# Patient Record
Sex: MALE | Race: ASIAN | NOT HISPANIC OR LATINO | Employment: FULL TIME | URBAN - METROPOLITAN AREA
[De-identification: names, ages, dates, MRNs, and addresses within clinical notes are randomized per-mention and may not be internally consistent; named-entity substitution may affect disease eponyms.]

---

## 2021-12-28 PROBLEM — B01.9 CHICKEN POX: Status: ACTIVE | Noted: 2021-12-28

## 2021-12-30 ENCOUNTER — OFFICE VISIT (OUTPATIENT)
Dept: FAMILY MEDICINE CLINIC | Facility: CLINIC | Age: 36
End: 2021-12-30

## 2021-12-30 ENCOUNTER — PATIENT ROUNDING (BHMG ONLY) (OUTPATIENT)
Dept: FAMILY MEDICINE CLINIC | Facility: CLINIC | Age: 36
End: 2021-12-30

## 2021-12-30 VITALS
TEMPERATURE: 97.6 F | WEIGHT: 238 LBS | OXYGEN SATURATION: 95 % | HEART RATE: 74 BPM | DIASTOLIC BLOOD PRESSURE: 84 MMHG | SYSTOLIC BLOOD PRESSURE: 128 MMHG | HEIGHT: 69 IN | BODY MASS INDEX: 35.25 KG/M2

## 2021-12-30 DIAGNOSIS — H90.42 SENSORINEURAL HEARING LOSS (SNHL) OF LEFT EAR WITH UNRESTRICTED HEARING OF RIGHT EAR: ICD-10-CM

## 2021-12-30 DIAGNOSIS — Z00.00 ENCOUNTER FOR MEDICAL EXAMINATION TO ESTABLISH CARE: Primary | ICD-10-CM

## 2021-12-30 PROCEDURE — 99385 PREV VISIT NEW AGE 18-39: CPT | Performed by: FAMILY MEDICINE

## 2021-12-30 NOTE — PROGRESS NOTES
Chief Complaint   Patient presents with   • Establish Care        Subjective     Isaias Mitchell  has no past medical history on file.    Establish care-he states up until now he has never really had a primary care physician.  He has no specific concerns today.  He has no chronic illnesses or medications.      PHQ-2 Depression Screening  Little interest or pleasure in doing things? 0   Feeling down, depressed, or hopeless? 0   PHQ-2 Total Score 0   PHQ-9 Depression Screening  Little interest or pleasure in doing things? 0   Feeling down, depressed, or hopeless? 0   Trouble falling or staying asleep, or sleeping too much?     Feeling tired or having little energy?     Poor appetite or overeating?     Feeling bad about yourself - or that you are a failure or have let yourself or your family down?     Trouble concentrating on things, such as reading the newspaper or watching television?     Moving or speaking so slowly that other people could have noticed? Or the opposite - being so fidgety or restless that you have been moving around a lot more than usual?     Thoughts that you would be better off dead, or of hurting yourself in some way?     PHQ-9 Total Score 0   If you checked off any problems, how difficult have these problems made it for you to do your work, take care of things at home, or get along with other people?       No Known Allergies    Prior to Admission medications    Not on File        Patient Active Problem List   Diagnosis   • Chicken pox   • Encounter for medical examination to [facility] care   • Sensorineural hearing loss (SNHL) of left ear with unrestricted hearing of right ear        History reviewed. No pertinent surgical history.    Social History     Socioeconomic History   • Marital status:    Tobacco Use   • Smoking status: Never Smoker   • Smokeless tobacco: Never Used   Vaping Use   • Vaping Use: Never used       History reviewed. No pertinent family history.    Family history,  "surgical history, past medical history, Allergies and med's reviewed with patient today and updated in [facility] EMR.     ROS:  Review of Systems   Constitutional: Negative for fatigue.   HENT: Positive for hearing loss (Left ear). Negative for congestion, postnasal drip and rhinorrhea.    Eyes: Negative for blurred vision and visual disturbance.   Respiratory: Negative for cough, chest tightness, shortness of breath and wheezing.    Cardiovascular: Negative for chest pain and palpitations.   Gastrointestinal: Negative for abdominal pain, constipation and diarrhea.   Neurological: Negative for headache.   Psychiatric/Behavioral: Negative for depressed mood. The patient is not nervous/anxious.        OBJECTIVE:  Vitals:    12/30/21 1010   BP: 128/84   BP Location: Right arm   Patient Position: Sitting   Pulse: 74   Temp: 97.6 °F (36.4 °C)   SpO2: 95%   Weight: 108 kg (238 lb)   Height: 175.3 cm (69\")     No exam data present   Body mass index is 35.15 kg/m².  No LMP for male patient.    Physical Exam  Vitals and nursing note reviewed.   Constitutional:       General: He is not in acute distress.     Appearance: Normal appearance. He is obese.   HENT:      Head: Normocephalic.      Right Ear: Tympanic membrane, ear canal and external ear normal.      Left Ear: Tympanic membrane, ear canal and external ear normal.      Nose: Nose normal.      Mouth/Throat:      Mouth: Mucous membranes are moist.      Pharynx: Oropharynx is clear.   Eyes:      General: No scleral icterus.     Conjunctiva/sclera: Conjunctivae normal.      Pupils: Pupils are equal, round, and reactive to light.   Cardiovascular:      Rate and Rhythm: Normal rate and regular rhythm.      Pulses: Normal pulses.      Heart sounds: Normal heart sounds. No murmur heard.      Pulmonary:      Effort: Pulmonary effort is normal.      Breath sounds: Normal breath sounds. No wheezing, rhonchi or rales.   Abdominal:      General: Abdomen is flat. Bowel sounds are normal. "      Palpations: Abdomen is soft. There is no mass.      Tenderness: There is no abdominal tenderness. There is no right CVA tenderness.   Musculoskeletal:      Cervical back: No rigidity or tenderness.   Lymphadenopathy:      Cervical: No cervical adenopathy.   Skin:     General: Skin is warm and dry.      Coloration: Skin is not jaundiced.      Findings: No rash.   Neurological:      General: No focal deficit present.      Mental Status: He is alert and oriented to person, place, and time.   Psychiatric:         Mood and Affect: Mood normal.         Thought Content: Thought content normal.         Judgment: Judgment normal.         Procedures    No visits with results within 30 Day(s) from this visit.   Latest known visit with results is:   No results found for any previous visit.       ASSESSMENT/ PLAN:    Diagnoses and all orders for this visit:    1. Encounter for medical examination to establish care (Primary)  Assessment & Plan:  He is an otherwise healthy young man.  He just needs continue to work on lifestyle changes with diet exercise and weight loss.    Orders:  -     Comprehensive Metabolic Panel  -     CBC & Differential  -     TSH  -     Lipid Panel    2. Sensorineural hearing loss (SNHL) of left ear with unrestricted hearing of right ear  Assessment & Plan:  He has had a chronic hearing loss in his left ear since the age of about 15.  The cause of this is unknown.  He states since that period of time he has not gotten any worse.        Orders Placed Today:     No orders of the defined types were placed in this encounter.       Management Plan:     An After Visit Summary was printed and given to the patient at discharge.    Follow-up: Return in about 1 year (around 12/30/2022).    Andreas Seymour,  12/30/2021 11:03 EST  This note was electronically signed.

## 2021-12-30 NOTE — PROGRESS NOTES
"December 30, 2021    Hello, may I speak with Isaias Mitchell?    My name is KATALINA     I am  with [facility]  [address] [place] [address] [address] [place] [id number] [phone].    Before we get started may I verify your date of birth? 1985 \"Yes\"    I am calling to officially welcome you to our practice and ask about your recent visit. Is this a good time to talk? \"Yes\"     Tell me about your visit with us. What things went well?  \"It was professional, I felt like good about the visit.\"       We're always looking for ways to make our patients' experiences even better. Do you have recommendations on ways we may improve?    \" I think the only thing I had a issue with was I had to wait awhile.   I understand everyone is stretched.  The start time was 15min before start time but I came 30 minutes early.\"      Overall were you satisfied with your first visit to our practice? \"Yes\"       I appreciate you taking the time to speak with me today. Is there anything else I can do for you? \"No\"    Thank you, and have a great day.      "

## 2022-01-06 ENCOUNTER — TELEPHONE (OUTPATIENT)
Dept: FAMILY MEDICINE CLINIC | Facility: CLINIC | Age: 37
End: 2022-01-06

## 2023-01-03 ENCOUNTER — OFFICE VISIT (OUTPATIENT)
Dept: FAMILY MEDICINE CLINIC | Facility: CLINIC | Age: 38
End: 2023-01-03
Payer: COMMERCIAL

## 2023-01-03 VITALS
WEIGHT: 245 LBS | BODY MASS INDEX: 36.29 KG/M2 | SYSTOLIC BLOOD PRESSURE: 122 MMHG | TEMPERATURE: 97.8 F | DIASTOLIC BLOOD PRESSURE: 73 MMHG | OXYGEN SATURATION: 96 % | HEIGHT: 69 IN | HEART RATE: 98 BPM

## 2023-01-03 DIAGNOSIS — Z00.00 ENCOUNTER FOR ANNUAL PHYSICAL EXAM: ICD-10-CM

## 2023-01-03 DIAGNOSIS — R06.83 LOUD SNORING: Primary | ICD-10-CM

## 2023-01-03 LAB
ALBUMIN SERPL-MCNC: 4.7 G/DL (ref 3.5–5.2)
ALBUMIN/GLOB SERPL: 2 G/DL
ALP SERPL-CCNC: 78 U/L (ref 39–117)
ALT SERPL W P-5'-P-CCNC: 82 U/L (ref 1–41)
ANION GAP SERPL CALCULATED.3IONS-SCNC: 9.6 MMOL/L (ref 5–15)
AST SERPL-CCNC: 28 U/L (ref 1–40)
BASOPHILS # BLD AUTO: 0.05 10*3/MM3 (ref 0–0.2)
BASOPHILS NFR BLD AUTO: 0.8 % (ref 0–1.5)
BILIRUB SERPL-MCNC: 0.4 MG/DL (ref 0–1.2)
BUN SERPL-MCNC: 13 MG/DL (ref 6–20)
BUN/CREAT SERPL: 14.1 (ref 7–25)
CALCIUM SPEC-SCNC: 9.4 MG/DL (ref 8.6–10.5)
CHLORIDE SERPL-SCNC: 101 MMOL/L (ref 98–107)
CHOLEST SERPL-MCNC: 211 MG/DL (ref 0–200)
CO2 SERPL-SCNC: 27.4 MMOL/L (ref 22–29)
CREAT SERPL-MCNC: 0.92 MG/DL (ref 0.76–1.27)
DEPRECATED RDW RBC AUTO: 44.5 FL (ref 37–54)
EGFRCR SERPLBLD CKD-EPI 2021: 111.2 ML/MIN/1.73
EOSINOPHIL # BLD AUTO: 0.14 10*3/MM3 (ref 0–0.4)
EOSINOPHIL NFR BLD AUTO: 2.1 % (ref 0.3–6.2)
ERYTHROCYTE [DISTWIDTH] IN BLOOD BY AUTOMATED COUNT: 13.1 % (ref 12.3–15.4)
GLOBULIN UR ELPH-MCNC: 2.4 GM/DL
GLUCOSE SERPL-MCNC: 118 MG/DL (ref 65–99)
HCT VFR BLD AUTO: 43.7 % (ref 37.5–51)
HDLC SERPL-MCNC: 30 MG/DL (ref 40–60)
HGB BLD-MCNC: 14.5 G/DL (ref 13–17.7)
IMM GRANULOCYTES # BLD AUTO: 0.02 10*3/MM3 (ref 0–0.05)
IMM GRANULOCYTES NFR BLD AUTO: 0.3 % (ref 0–0.5)
LDLC SERPL CALC-MCNC: 123 MG/DL (ref 0–100)
LDLC/HDLC SERPL: 3.83 {RATIO}
LYMPHOCYTES # BLD AUTO: 2.15 10*3/MM3 (ref 0.7–3.1)
LYMPHOCYTES NFR BLD AUTO: 32.9 % (ref 19.6–45.3)
MCH RBC QN AUTO: 30.5 PG (ref 26.6–33)
MCHC RBC AUTO-ENTMCNC: 33.2 G/DL (ref 31.5–35.7)
MCV RBC AUTO: 91.8 FL (ref 79–97)
MONOCYTES # BLD AUTO: 0.57 10*3/MM3 (ref 0.1–0.9)
MONOCYTES NFR BLD AUTO: 8.7 % (ref 5–12)
NEUTROPHILS NFR BLD AUTO: 3.6 10*3/MM3 (ref 1.7–7)
NEUTROPHILS NFR BLD AUTO: 55.2 % (ref 42.7–76)
NRBC BLD AUTO-RTO: 0 /100 WBC (ref 0–0.2)
PLATELET # BLD AUTO: 302 10*3/MM3 (ref 140–450)
PMV BLD AUTO: 10.6 FL (ref 6–12)
POTASSIUM SERPL-SCNC: 3.8 MMOL/L (ref 3.5–5.2)
PROT SERPL-MCNC: 7.1 G/DL (ref 6–8.5)
RBC # BLD AUTO: 4.76 10*6/MM3 (ref 4.14–5.8)
SODIUM SERPL-SCNC: 138 MMOL/L (ref 136–145)
TRIGL SERPL-MCNC: 330 MG/DL (ref 0–150)
TSH SERPL DL<=0.05 MIU/L-ACNC: 1.4 UIU/ML (ref 0.27–4.2)
VLDLC SERPL-MCNC: 58 MG/DL (ref 5–40)
WBC NRBC COR # BLD: 6.53 10*3/MM3 (ref 3.4–10.8)

## 2023-01-03 PROCEDURE — 80050 GENERAL HEALTH PANEL: CPT | Performed by: FAMILY MEDICINE

## 2023-01-03 PROCEDURE — 99395 PREV VISIT EST AGE 18-39: CPT | Performed by: FAMILY MEDICINE

## 2023-01-03 PROCEDURE — 36415 COLL VENOUS BLD VENIPUNCTURE: CPT | Performed by: FAMILY MEDICINE

## 2023-01-03 PROCEDURE — 80061 LIPID PANEL: CPT | Performed by: FAMILY MEDICINE

## 2023-01-03 NOTE — PROGRESS NOTES
Chief Complaint   Patient presents with   • Annual Exam     C/O possible sleep apnea         Subjective     Mimi-Tomás Mitchell  has no past medical history on file.    Annual exam- overall he states he is doing well.  He has had some of his initial series of COVID but has never had the latest greatest booster vaccine.  He does state that his wife has told him that he snores loudly and has some gasping breaths while sleeping at times.  He does have some difficulty staying awake at times but mild.      PHQ-2 Depression Screening  Little interest or pleasure in doing things?     Feeling down, depressed, or hopeless?     PHQ-2 Total Score     PHQ-9 Depression Screening  Little interest or pleasure in doing things?     Feeling down, depressed, or hopeless?     Trouble falling or staying asleep, or sleeping too much?     Feeling tired or having little energy?     Poor appetite or overeating?     Feeling bad about yourself - or that you are a failure or have let yourself or your family down?     Trouble concentrating on things, such as reading the newspaper or watching television?     Moving or speaking so slowly that other people could have noticed? Or the opposite - being so fidgety or restless that you have been moving around a lot more than usual?     Thoughts that you would be better off dead, or of hurting yourself in some way?     PHQ-9 Total Score     If you checked off any problems, how difficult have these problems made it for you to do your work, take care of things at home, or get along with other people?       No Known Allergies    Prior to Admission medications    Not on File        Patient Active Problem List   Diagnosis   • Chicken pox   • Encounter for medical examination to establish care   • Sensorineural hearing loss (SNHL) of left ear with unrestricted hearing of right ear   • Encounter for annual physical exam   • Loud snoring        History reviewed. No pertinent surgical history.    Social History      Socioeconomic History   • Marital status:    Tobacco Use   • Smoking status: Never   • Smokeless tobacco: Never   Vaping Use   • Vaping Use: Never used       History reviewed. No pertinent family history.    Family history, surgical history, past medical history, Allergies and meds reviewed with patient today and updated in [facility] EMR.     ROS:  Review of Systems   Constitutional: Negative for fatigue.   HENT: Negative for congestion, postnasal drip and rhinorrhea.    Eyes: Negative for blurred vision and visual disturbance.   Respiratory: Negative for cough, chest tightness, shortness of breath and wheezing.         (+) snoring   Cardiovascular: Negative for chest pain and palpitations.   Gastrointestinal: Negative for constipation and diarrhea.   Allergic/Immunologic: Negative for environmental allergies.   Neurological: Negative for headache.   Psychiatric/Behavioral: Negative for depressed mood. The patient is not nervous/anxious.        OBJECTIVE:  Vitals:    01/03/23 0929   BP: 122/73   BP Location: Left arm   Patient Position: Sitting   Pulse: 98   Temp: 97.8 °F (36.6 °C)   SpO2: 96%   Weight: 111 kg (245 lb)   Height: 175.3 cm (69\")     No results found.   Body mass index is 36.18 kg/m².  No LMP for male patient.    Physical Exam  Vitals and nursing note reviewed.   Constitutional:       General: He is not in acute distress.     Appearance: Normal appearance. He is obese.   HENT:      Head: Normocephalic.      Right Ear: Tympanic membrane, ear canal and external ear normal.      Left Ear: Tympanic membrane, ear canal and external ear normal.      Nose: Nose normal.      Mouth/Throat:      Mouth: Mucous membranes are moist.      Pharynx: Oropharynx is clear.   Eyes:      General: No scleral icterus.     Conjunctiva/sclera: Conjunctivae normal.      Pupils: Pupils are equal, round, and reactive to light.   Cardiovascular:      Rate and Rhythm: Normal rate and regular rhythm.      Pulses: Normal  pulses.      Heart sounds: Normal heart sounds. No murmur heard.  Pulmonary:      Effort: Pulmonary effort is normal.      Breath sounds: Normal breath sounds. No wheezing, rhonchi or rales.   Musculoskeletal:      Cervical back: Neck supple. No rigidity or tenderness.   Lymphadenopathy:      Cervical: No cervical adenopathy.   Skin:     General: Skin is warm and dry.      Coloration: Skin is not jaundiced.      Findings: No rash.   Neurological:      General: No focal deficit present.      Mental Status: He is alert and oriented to person, place, and time.   Psychiatric:         Mood and Affect: Mood normal.         Thought Content: Thought content normal.         Judgment: Judgment normal.         Procedures    No visits with results within 30 Day(s) from this visit.   Latest known visit with results is:   No results found for any previous visit.       ASSESSMENT/ PLAN:    Diagnoses and all orders for this visit:    1. Loud snoring (Primary)  Assessment & Plan:  With his excessive snoring and gasping respirations at night we will set him up for sleep evaluation.    Orders:  -     Ambulatory Referral to Sleep Medicine    2. Encounter for annual physical exam  -     Comprehensive Metabolic Panel  -     CBC & Differential  -     TSH  -     Lipid Panel      Orders Placed Today:     No orders of the defined types were placed in this encounter.       Management Plan:     An After Visit Summary was printed and given to the patient at discharge.    Follow-up: Return in about 1 year (around 1/3/2024) for Annual physical.    Andreas Seymour,  1/3/2023 10:18 EST  This note was electronically signed.  Answers for HPI/ROS submitted by the patient on 12/27/2022  What is the primary reason for your visit?: Other  Please describe your symptoms.: Annual Checkup  Have you had these symptoms before?: Yes  How long have you been having these symptoms?: Greater than 2 weeks

## 2023-01-03 NOTE — ASSESSMENT & PLAN NOTE
With his excessive snoring and gasping respirations at night we will set him up for sleep evaluation.

## 2023-01-04 DIAGNOSIS — R94.5 LIVER FUNCTION ABNORMALITY: Primary | ICD-10-CM

## 2023-01-09 ENCOUNTER — CLINICAL SUPPORT (OUTPATIENT)
Dept: FAMILY MEDICINE CLINIC | Facility: CLINIC | Age: 38
End: 2023-01-09
Payer: COMMERCIAL

## 2023-01-09 DIAGNOSIS — R94.5 LIVER FUNCTION ABNORMALITY: ICD-10-CM

## 2023-01-09 DIAGNOSIS — Z11.59 NEED FOR HEPATITIS C SCREENING TEST: Primary | ICD-10-CM

## 2023-01-09 LAB
HBA1C MFR BLD: 5.5 % (ref 4.8–5.6)
HCV AB SER DONR QL: NORMAL

## 2023-01-09 PROCEDURE — 86803 HEPATITIS C AB TEST: CPT | Performed by: FAMILY MEDICINE

## 2023-01-09 PROCEDURE — 36415 COLL VENOUS BLD VENIPUNCTURE: CPT | Performed by: FAMILY MEDICINE

## 2023-01-09 PROCEDURE — 83036 HEMOGLOBIN GLYCOSYLATED A1C: CPT | Performed by: FAMILY MEDICINE

## 2023-06-28 PROBLEM — G47.10 HYPERSOMNIA: Status: ACTIVE | Noted: 2023-06-28

## 2023-06-28 PROBLEM — E66.9 CLASS 2 OBESITY: Status: ACTIVE | Noted: 2023-06-28

## 2023-06-28 PROBLEM — G47.8 NON-RESTORATIVE SLEEP: Status: ACTIVE | Noted: 2023-06-28

## 2023-06-28 PROBLEM — G47.30 OBSERVED SLEEP APNEA: Status: ACTIVE | Noted: 2023-06-28

## 2023-07-26 ENCOUNTER — HOSPITAL ENCOUNTER (OUTPATIENT)
Dept: SLEEP MEDICINE | Facility: HOSPITAL | Age: 38
Discharge: HOME | End: 2023-07-26
Admitting: INTERNAL MEDICINE
Payer: COMMERCIAL

## 2023-07-26 DIAGNOSIS — G47.30 OBSERVED SLEEP APNEA: ICD-10-CM

## 2023-07-26 DIAGNOSIS — E66.9 CLASS 2 OBESITY: ICD-10-CM

## 2023-07-26 DIAGNOSIS — G47.10 HYPERSOMNIA: ICD-10-CM

## 2023-07-26 DIAGNOSIS — R06.83 SNORING: ICD-10-CM

## 2023-07-26 DIAGNOSIS — G47.8 NON-RESTORATIVE SLEEP: ICD-10-CM

## 2023-07-26 PROCEDURE — 95806 SLEEP STUDY UNATT&RESP EFFT: CPT

## 2023-07-31 ENCOUNTER — TELEPHONE (OUTPATIENT)
Dept: SLEEP MEDICINE | Facility: HOSPITAL | Age: 38
End: 2023-07-31
Payer: COMMERCIAL

## 2023-07-31 DIAGNOSIS — G47.33 OSA (OBSTRUCTIVE SLEEP APNEA): Primary | ICD-10-CM

## 2023-07-31 DIAGNOSIS — R06.83 SNORING: ICD-10-CM

## 2023-07-31 PROCEDURE — 95806 SLEEP STUDY UNATT&RESP EFFT: CPT | Performed by: INTERNAL MEDICINE

## 2023-09-18 ENCOUNTER — OFFICE VISIT (OUTPATIENT)
Dept: FAMILY MEDICINE CLINIC | Facility: CLINIC | Age: 38
End: 2023-09-18
Payer: COMMERCIAL

## 2023-09-18 VITALS
OXYGEN SATURATION: 99 % | HEIGHT: 69 IN | DIASTOLIC BLOOD PRESSURE: 88 MMHG | HEART RATE: 67 BPM | WEIGHT: 243.2 LBS | BODY MASS INDEX: 36.02 KG/M2 | TEMPERATURE: 98 F | SYSTOLIC BLOOD PRESSURE: 130 MMHG

## 2023-09-18 DIAGNOSIS — H90.42 SENSORINEURAL HEARING LOSS (SNHL) OF LEFT EAR WITH UNRESTRICTED HEARING OF RIGHT EAR: ICD-10-CM

## 2023-09-18 DIAGNOSIS — H65.02 NON-RECURRENT ACUTE SEROUS OTITIS MEDIA OF LEFT EAR: Primary | ICD-10-CM

## 2023-09-18 DIAGNOSIS — H90.42 SENSORINEURAL HEARING LOSS (SNHL) OF LEFT EAR WITH UNRESTRICTED HEARING OF RIGHT EAR: Primary | ICD-10-CM

## 2023-09-18 PROCEDURE — 99213 OFFICE O/P EST LOW 20 MIN: CPT | Performed by: NURSE PRACTITIONER

## 2023-09-18 RX ORDER — AMOXICILLIN AND CLAVULANATE POTASSIUM 875; 125 MG/1; MG/1
1 TABLET, FILM COATED ORAL 2 TIMES DAILY
Qty: 20 TABLET | Refills: 0 | Status: SHIPPED | OUTPATIENT
Start: 2023-09-18 | End: 2023-09-28

## 2023-09-18 NOTE — PROGRESS NOTES
"Chief Complaint  Earache (Started a week ago, off and on pain, left ear, had jaw issues with this )    Subjective          Isaias Mitchell is a 37 y.o. male who presents to [facility]    History of Present Illness    Complains of last 7 days having pain in let ear, mainly feels like pressure and worse when closes jaw.  Has started CPAP 20 days ago.  Hearing loss in left ear for years.  PHQ-2 Total Score:     PHQ-9 Total Score:          Review of Systems       Medical History: has no past medical history on file.     Surgical History: has no past surgical history on file.     Family History: family history is not on file.     Social History: reports that he has never smoked. He has never used smokeless tobacco. He reports that he does not drink alcohol and does not use drugs.    Allergies: Patient has no known allergies.      Health Maintenance Due   Topic Date Due    BMI FOLLOWUP  Never done    TDAP/TD VACCINES (1 - Tdap) Never done    COVID-19 Vaccine (2 - Pfizer series) 03/10/2022            Current Outpatient Medications:     amoxicillin-clavulanate (AUGMENTIN) 875-125 MG per tablet, Take 1 tablet by mouth 2 (Two) Times a Day for 10 days., Disp: 20 tablet, Rfl: 0      Immunization History   Administered Date(s) Administered    COVID-19 (PFIZER) Purple Cap Monovalent 01/13/2022         Objective       Vitals:    09/18/23 0702   BP: 130/88   BP Location: Left arm   Patient Position: Sitting   Cuff Size: Adult   Pulse: 67   Temp: 98 °F (36.7 °C)   TempSrc: Temporal   SpO2: 99%   Weight: 110 kg (243 lb 3.2 oz)   Height: 175.3 cm (69\")   PainSc:   2   PainLoc: Ear      Body mass index is 35.91 kg/m².   Wt Readings from Last 3 Encounters:   09/18/23 110 kg (243 lb 3.2 oz)   06/28/23 109 kg (239 lb 6.4 oz)   01/03/23 111 kg (245 lb)      BP Readings from Last 3 Encounters:   09/18/23 130/88   06/28/23 134/79   01/03/23 122/73        Class 2 Severe Obesity (BMI >=35 and <=39.9). " Obesity-related health conditions include the following: obstructive sleep apnea. Obesity is newly identified. BMI is is above average; BMI management plan is completed. We discussed portion control and increasing exercise.       Physical Exam  Vitals reviewed.   Constitutional:       Appearance: Normal appearance.   HENT:      Head: Normocephalic and atraumatic.      Left Ear: Tympanic membrane is scarred, perforated and erythematous.   Skin:     General: Skin is warm and dry.   Neurological:      Mental Status: He is alert and oriented to person, place, and time.   Psychiatric:         Mood and Affect: Mood normal.         Behavior: Behavior normal.         Thought Content: Thought content normal.         Judgment: Judgment normal.           Result Review :       Common labs          1/3/2023    10:32 1/9/2023    09:31   Common Labs   Glucose 118     BUN 13     Creatinine 0.92     Sodium 138     Potassium 3.8     Chloride 101     Calcium 9.4     Albumin 4.7     Total Bilirubin 0.4     Alkaline Phosphatase 78     AST (SGOT) 28     ALT (SGPT) 82     WBC 6.53     Hemoglobin 14.5     Hematocrit 43.7     Platelets 302     Total Cholesterol 211     Triglycerides 330     HDL Cholesterol 30     LDL Cholesterol  123     Hemoglobin A1C  5.50                       Assessment and Plan        Diagnoses and all orders for this visit:    1. Non-recurrent acute serous otitis media of left ear (Primary)  -     amoxicillin-clavulanate (AUGMENTIN) 875-125 MG per tablet; Take 1 tablet by mouth 2 (Two) Times a Day for 10 days.  Dispense: 20 tablet; Refill: 0    2. Sensorineural hearing loss (SNHL) of left ear with unrestricted hearing of right ear  -     Ambulatory Referral to Audiology          Follow Up     Return if symptoms worsen or fail to improve, for Follow up with Dr Seymour.    Patient was given instructions and counseling regarding his condition or for health maintenance advice. Please see specific information pulled into  the AVS if appropriate.     Yamileth Rodgers, APRN

## 2023-10-04 ENCOUNTER — OFFICE VISIT (OUTPATIENT)
Dept: SLEEP MEDICINE | Facility: HOSPITAL | Age: 38
End: 2023-10-04
Payer: COMMERCIAL

## 2023-10-04 VITALS
HEART RATE: 68 BPM | WEIGHT: 240.3 LBS | BODY MASS INDEX: 35.59 KG/M2 | SYSTOLIC BLOOD PRESSURE: 117 MMHG | DIASTOLIC BLOOD PRESSURE: 78 MMHG | HEIGHT: 69 IN | OXYGEN SATURATION: 97 %

## 2023-10-04 DIAGNOSIS — G47.33 OSA ON CPAP: Primary | ICD-10-CM

## 2023-10-04 DIAGNOSIS — E66.9 CLASS 2 OBESITY: ICD-10-CM

## 2023-10-04 PROBLEM — G47.10 HYPERSOMNIA: Status: RESOLVED | Noted: 2023-06-28 | Resolved: 2023-10-04

## 2023-10-04 PROBLEM — G47.8 NON-RESTORATIVE SLEEP: Status: RESOLVED | Noted: 2023-06-28 | Resolved: 2023-10-04

## 2023-10-04 PROBLEM — R06.83 SNORING: Status: RESOLVED | Noted: 2023-01-03 | Resolved: 2023-10-04

## 2023-10-04 PROCEDURE — 99213 OFFICE O/P EST LOW 20 MIN: CPT | Performed by: INTERNAL MEDICINE

## 2023-10-04 PROCEDURE — G0463 HOSPITAL OUTPT CLINIC VISIT: HCPCS

## 2023-10-04 NOTE — PROGRESS NOTES
"  [address] [address]  Phone: [phone]  Fax: [phone]      SLEEP CLINIC FOLLOW UP PROGRESS NOTE.    Isaias Mitchell  [id number]   1985  37 y.o.  male      PCP: Andreas Seymour DO      Date of visit: 10/4/2023    Chief Complaint   Patient presents with    Sleep Apnea    Obesity       HPI:  This is a 37 y.o. years old patient is here for the management of obstructive sleep apnea.  Sleep apnea is moderate in severity with a AHI of 20/hr. Patient is using positive airway pressure therapy with auto CPAP and the symptoms of sleep apnea have improved significantly on the therapy. Normally patient goes to bed at 12 midnight and wakes up at 8 AM .  The patient wakes up 1 time(s) during the night and has no problem going back to sleep.  Feels refreshed after waking up.     Medications and allergies are reviewed by me and documented in the encounter.     SOCIAL (habits pertaining to sleep medicine)  History tobacco use:No   History of alcohol use: 0 per week  Caffeine use: 1     REVIEW OF SYSTEMS:   Pertaining positive symptoms are:  [place] Sleepiness Scale :Total score: 4   Nasal congestion      PHYSICAL EXAMINATION:  CONSTITUTIONAL:  Vitals:    10/04/23 0900   BP: 117/78   Pulse: 68   SpO2: 97%   Weight: 109 kg (240 lb 4.8 oz)   Height: 175.3 cm (69.02\")    Body mass index is 35.47 kg/m².   NOSE: nasal passages are clear, No deformities noted   RESP SYSTEM: Not in any respiratory distress, no chest deformities noted,   CARDIOVASULAR: No edema noted  NEURO: Oriented x 3, gait normal,  Mood and affect appeared appropriate      Data reviewed:  The Smart card downloaded on 10/4/2023 has been reviewed independently by me for compliance and discussed the data with the patient.   Compliance; 97%  More than 4 hr use, 80%  Average use of the device 5 hours and 7-minute per night  Residual AHI: 2.9 /hr (goal < 5.0 /hr)  Mask type: Fullface " mask  Device: ResMed  DME: [employer]      ASSESSMENT AND PLAN:  Obstructive sleep apnea ( G 47.33).  The symptoms of sleep apnea have improved with the device and the treatment.  Patient's compliance with the device is excellent for treatment of sleep apnea.  I have independently reviewed the smart card down load and discussed with the patient the download data and encouarged the patient to continue to use the device.The residual AHI is acceptable. The device is benefiting the patient and the device is medically necessary.  Without proper control of sleep apnea and good compliance there is a increased risk for hypertension, diabetes mellitus and nonrestorative sleep with hypersomnia which can increase risk for motor vehicle accidents.  Untreated sleep apnea is also a risk factor for development of atrial fibrillation, pulmonary hypertension, insulin resistance and stroke. The patient is also instructed to get the supplies from the DME company and and change them on a regular basis.  A prescription for supplies has been sent to the DME company.  I have also discussed the good sleep hygiene habits and adequate amount of sleep needed for good health.  Obesity  2 with BMI is Body mass index is 35.47 kg/m².. I have discuss the relationship between the weight and sleep apnea. The benefit of weight loss in reducing severity of sleep apnea was discussed. Discussed diet and exercise with the patient to achieve ideal BMI.  Return in about 1 year (around 10/4/2024) for with smart card down load. . Patient's questions were answered.    10/4/2023  Shelby Sawyer MD  Sleep Medicine.  Medical Director,   [facility].

## 2023-11-20 ENCOUNTER — PROCEDURE VISIT (OUTPATIENT)
Dept: OTOLARYNGOLOGY | Facility: CLINIC | Age: 38
End: 2023-11-20
Payer: COMMERCIAL

## 2023-11-20 DIAGNOSIS — H90.12 CONDUCTIVE HEARING LOSS OF LEFT EAR WITH UNRESTRICTED HEARING OF RIGHT EAR: Primary | ICD-10-CM

## 2023-11-20 PROCEDURE — 92550 TYMPANOMETRY & REFLEX THRESH: CPT | Performed by: AUDIOLOGIST

## 2023-11-20 PROCEDURE — 92557 COMPREHENSIVE HEARING TEST: CPT | Performed by: AUDIOLOGIST

## 2023-11-20 NOTE — PROGRESS NOTES
AUDIOMETRIC EVALUATION      Name:  Isaias Mitchell  :  1985  Age:  38 y.o.  Date of Evaluation:  2023       History:  Mr. Mitchell is seen today for a hearing evaluation due to aural fullness at the left ear.    Audiologic Information:  Concerns for Hearing: Yes left ear  PETs: No  Other otologic surgical history: None  Aural Pressure/Fullness: Left ear  Otalgia: No  Otorrhea: No  Tinnitus: None  Dizziness: No  Noise Exposure: No  Family history of hearing loss: Unknown  Head trauma requiring hospital stay: No  Chemotherapy: None  Other significant history: No    EVALUATION:    See audiogram    RESULTS:    Otoscopic Evaluation:  Right: Unremarkable  Left: Small submembrane centrally located in the tympanic membrane     Tympanometry (226 Hz):  Right: Type A  Left: Type A    Acoustic reflex testing: Elevated ipsilateral reflexes at the right ear and absent reflexes for the left ear.    IMPRESSIONS:  Pure tone thresholds for the right ear shows hearing within normal limits.  Pure tone thresholds for the left ear shows a mild and moderate conductive hearing loss through most speech tones.  Patient was counseled with regard to the findings.    RECOMMENDATIONS/PLAN:  Follow-up with ENT medical management  Discussed results and recommendations with patient.         Dave King M.S, [facility]  Licensed Audiologist

## 2024-06-11 ENCOUNTER — OFFICE VISIT (OUTPATIENT)
Dept: OTOLARYNGOLOGY | Facility: CLINIC | Age: 39
End: 2024-06-11
Payer: COMMERCIAL

## 2024-06-11 VITALS — TEMPERATURE: 97.8 F | BODY MASS INDEX: 36.17 KG/M2 | WEIGHT: 244.2 LBS | HEIGHT: 69 IN

## 2024-06-11 DIAGNOSIS — H90.12 CONDUCTIVE HEARING LOSS OF LEFT EAR WITH UNRESTRICTED HEARING OF RIGHT EAR: Primary | ICD-10-CM

## 2024-06-11 DIAGNOSIS — H90.42 SENSORINEURAL HEARING LOSS (SNHL) OF LEFT EAR WITH UNRESTRICTED HEARING OF RIGHT EAR: ICD-10-CM

## 2024-06-11 PROCEDURE — 99203 OFFICE O/P NEW LOW 30 MIN: CPT | Performed by: OTOLARYNGOLOGY

## 2024-06-11 NOTE — PROGRESS NOTES
Patient Name: Isaias Mitchell   Visit Date: 06/11/2024   Patient ID: [id number]  Provider: Galileo Churchill MD    Sex: male  [facility]   YOB: 1985  Location Address: [address] [place] [address], [address] [place] [address],?[phone]    Primary Care Provider Andreas Seymour,   Location Phone: [phone]    Referring Provider: No ref. provider found        Chief Complaint  Hearing Loss (New Patient )    Subjective    History of Present Illness  Isaias Mitchell is a 38 y.o. male who presents to [facility] today as a consult from No ref. provider found.    He presents to the clinic today for evaluation of his ears and hearing, and left-sided conductive hearing loss.  He informs me that he has had issues since about [age] years of age when he first noted decreased hearing on the left side.  He denies any major issues with recurrent ear disease or any incidents that may have affected his ears.  He did have an audiogram performed in November and for what ever reason canceled a few appointments and presents now for further evaluation.  I reviewed the audiogram results which show normal hearing on the right side with conductive hearing loss between 15 and 30 dB on the left side.  Word discrimination scores are 100% on both sides and tympanograms were normal.  He denies any family history of hearing loss at an early age and denies any major issues with noise exposure.  He informs me that he functions well and does not have any major issues with his hearing although he does notice muffled hearing on the left side.    Past Medical History:   Diagnosis Date    Ear fullness     Hearing loss     CRISTIAN (obstructive sleep apnea)        Past Surgical History:   Procedure Laterality Date    MOLE REMOVAL      AS CHILD    WISDOM TOOTH EXTRACTION         No current outpatient medications on file.     No Known Allergies    Family History   Problem  "Relation Age of Onset    Cancer Maternal Grandmother         Social History     Social History Narrative    Not on file       Objective     Vital Signs:   Temp 97.8 °F (36.6 °C) (Tympanic)   Ht 175.3 cm (69\")   Wt 111 kg (244 lb 3.2 oz)   BMI 36.06 kg/m²       Physical Exam    Constitutional   Appearance  : well developed, well-nourished, alert and in no acute distress, voice clear and strong    Head  Inspection  : no deformities or lesions  Face  Inspection  : No facial lesions; House-Brackmann I/VI bilaterally  Palpation  : No TMJ crepitus nor  muscle tenderness bilaterally    Eyes  Vision  Visual Fields  : Extraocular movements are intact. No spontaneous or gaze-induced nystagmus.  Conjunctivae  : clear  Sclerae  : clear  Pupils and Irises  : pupils equal, round, and reactive to light.     Ears, Nose, Mouth and Throat    Ears    External Ears  : appearance within normal limits, no lesions present  Otoscopic Examination  : Tympanic membrane appearance within normal limits bilaterally without perforations, well-aerated middle ears  Hearing  : intact to conversational voice both ears  Tunning fork testing:     :    Nose    External Nose  : appearance normal  Intranasal Exam  : mucosa within normal limits, vestibules normal, no intranasal lesions present, septum midline, sinuses non tender to percussion  Oral Cavity    Oral Mucosa  : oral mucosa normal without pallor or cyanosis  Lips  : lip appearance normal  Teeth  : normal dentition for age  Gums  : gums pink, non-swollen, no bleeding present  Tongue  : tongue appearance normal; normal mobility  Palate  : hard palate normal, soft palate appearance normal with symmetric mobility    Throat    Oropharynx  : no inflammation or lesions present, tonsils within normal limits  Hypopharynx  : appearance within normal limits, superior epiglottis within normal limits  Larynx  : appearance within normal limits, vocal cords within normal limits, no lesions " present    Neck  Inspection/Palpation  : normal appearance, no masses or tenderness, trachea midline; thyroid size normal, nontender, no nodules or masses present on palpation    Respiratory  Respiratory Effort  : breathing unlabored  Inspection of Chest  : normal appearance, no retractions    Cardiovascular  Heart  : regular rate and rhythm    Lymphatic  Neck  : no lymphadenopathy present  Supraclavicular Nodes  : no lymphadenopathy present  Preauricular Nodes  : no lymphadenopathy present    Skin and Subcutaneous Tissue  General Inspection  : Regarding face and neck - there are no rashes present, no lesions present, and no areas of discoloration    Neurologic  Cranial Nerves  : cranial nerves II-XII are grossly intact bilaterally  Gait and Station  : normal gait, able to stand without diffculty    Psychiatric  Judgement and Insight  : judgment and insight intact  Mood and Affect  : mood normal, affect appropriate              Assessment and Plan    Diagnoses and all orders for this visit:    1. Conductive hearing loss of left ear with unrestricted hearing of right ear (Primary)  -     Comprehensive Hearing Test; Future  -     Tympanometry; Future    2. Sensorineural hearing loss (SNHL) of left ear with unrestricted hearing of right ear  -     Comprehensive Hearing Test; Future  -     Tympanometry; Future    Ear exam today was completely normal.  I discussed audiogram results and it does appear that he has a conductive hearing loss with normal hearing in the right ear.  We discussed possible options including acicular chain abnormality, possible otosclerosis or stapedial fixation, or other causes.  Since his eardrum looks normal and the tympanograms were normal, there is likely an issue with the ossicular chain.  It is minimally bothersome to him and we discussed options including middle ear exploration versus retesting the hearing in 1 year versus hearing aid.  At this point he does not feel that he needs a  hearing aid and would like to avoid surgery.  I have ordered an audiogram for him to be done in about 1 year and will be glad to see him back to discuss the results and any further management.    Follow Up   No follow-ups on file.  Patient was given instructions and counseling regarding his condition or for health maintenance advice. Please see specific information pulled into the AVS if appropriate.

## 2024-07-01 ENCOUNTER — TELEPHONE (OUTPATIENT)
Dept: SLEEP MEDICINE | Facility: HOSPITAL | Age: 39
End: 2024-07-01
Payer: COMMERCIAL

## 2024-07-01 NOTE — TELEPHONE ENCOUNTER
Left message for patient to schedule annual follow up visit at [facility] at [phone], option 1 to schedule.

## 2024-09-05 ENCOUNTER — TELEPHONE (OUTPATIENT)
Dept: OTOLARYNGOLOGY | Facility: CLINIC | Age: 39
End: 2024-09-05
Payer: COMMERCIAL

## 2024-09-05 NOTE — TELEPHONE ENCOUNTER
Left message for patient to call our office.    HUB to Read:    We need to inform patient that Dr. Churchill is no longer with [facility].    We need to offer to reschedule appointment with one of our other providers, please schedule for next available      Patient needs a hearing test scheduled prior to follow up appointment       If with Dr Jackson please only use the 2:30 pm or 2:45 pm time slots for the follow up appointment for Dr Mehta patients thanks